# Patient Record
Sex: MALE | Race: WHITE | Employment: UNEMPLOYED | URBAN - METROPOLITAN AREA
[De-identification: names, ages, dates, MRNs, and addresses within clinical notes are randomized per-mention and may not be internally consistent; named-entity substitution may affect disease eponyms.]

---

## 2018-02-01 PROBLEM — K52.9 ENTERITIS: Status: ACTIVE | Noted: 2018-02-01

## 2018-02-01 PROBLEM — K56.609 SMALL BOWEL OBSTRUCTION (HCC): Status: ACTIVE | Noted: 2018-02-01

## 2018-02-03 PROBLEM — R77.8 ELEVATED TROPONIN: Status: ACTIVE | Noted: 2018-02-03

## 2018-02-05 PROBLEM — K52.9 ENTERITIS: Status: RESOLVED | Noted: 2018-02-01 | Resolved: 2018-02-05

## 2018-02-05 PROBLEM — I21.4 NSTEMI (NON-ST ELEVATED MYOCARDIAL INFARCTION) (HCC): Status: RESOLVED | Noted: 2018-02-03 | Resolved: 2018-02-05

## 2018-02-05 PROBLEM — I21.4 NSTEMI (NON-ST ELEVATED MYOCARDIAL INFARCTION) (HCC): Status: ACTIVE | Noted: 2018-02-03

## 2018-04-24 PROBLEM — R07.9 CHEST PAIN: Status: ACTIVE | Noted: 2018-04-24

## 2021-08-03 ENCOUNTER — APPOINTMENT (OUTPATIENT)
Dept: GENERAL RADIOLOGY | Age: 31
End: 2021-08-03
Attending: STUDENT IN AN ORGANIZED HEALTH CARE EDUCATION/TRAINING PROGRAM
Payer: MEDICAID

## 2021-08-03 ENCOUNTER — HOSPITAL ENCOUNTER (EMERGENCY)
Age: 31
Discharge: HOME | End: 2021-08-03
Attending: STUDENT IN AN ORGANIZED HEALTH CARE EDUCATION/TRAINING PROGRAM
Payer: MEDICAID

## 2021-08-03 VITALS
OXYGEN SATURATION: 99 % | WEIGHT: 305 LBS | HEIGHT: 71 IN | HEART RATE: 75 BPM | BODY MASS INDEX: 42.7 KG/M2 | SYSTOLIC BLOOD PRESSURE: 114 MMHG | RESPIRATION RATE: 23 BRPM | DIASTOLIC BLOOD PRESSURE: 63 MMHG | TEMPERATURE: 98 F

## 2021-08-03 DIAGNOSIS — R07.89 OTHER CHEST PAIN: Primary | ICD-10-CM

## 2021-08-03 LAB
ANION GAP SERPL CALC-SCNC: 2 MMOL/L (ref 3–18)
BASOPHILS # BLD: 0.1 K/UL (ref 0–0.1)
BASOPHILS NFR BLD: 1 % (ref 0–2)
BUN SERPL-MCNC: 10 MG/DL (ref 7–18)
BUN/CREAT SERPL: 13 (ref 12–20)
CALCIUM SERPL-MCNC: 8.9 MG/DL (ref 8.5–10.1)
CHLORIDE SERPL-SCNC: 106 MMOL/L (ref 100–111)
CO2 SERPL-SCNC: 29 MMOL/L (ref 21–32)
CREAT SERPL-MCNC: 0.77 MG/DL (ref 0.6–1.3)
DIFFERENTIAL METHOD BLD: ABNORMAL
EOSINOPHIL # BLD: 0.6 K/UL (ref 0–0.4)
EOSINOPHIL NFR BLD: 6 % (ref 0–5)
ERYTHROCYTE [DISTWIDTH] IN BLOOD BY AUTOMATED COUNT: 13.1 % (ref 11.6–14.5)
GLUCOSE SERPL-MCNC: 95 MG/DL (ref 74–99)
HCT VFR BLD AUTO: 43.8 % (ref 36–48)
HGB BLD-MCNC: 14.3 G/DL (ref 13–16)
LYMPHOCYTES # BLD: 2.4 K/UL (ref 0.9–3.6)
LYMPHOCYTES NFR BLD: 24 % (ref 21–52)
MAGNESIUM SERPL-MCNC: 2.2 MG/DL (ref 1.6–2.6)
MCH RBC QN AUTO: 26.1 PG (ref 24–34)
MCHC RBC AUTO-ENTMCNC: 32.6 G/DL (ref 31–37)
MCV RBC AUTO: 80.1 FL (ref 74–97)
MONOCYTES # BLD: 0.8 K/UL (ref 0.05–1.2)
MONOCYTES NFR BLD: 8 % (ref 3–10)
NEUTS SEG # BLD: 6.3 K/UL (ref 1.8–8)
NEUTS SEG NFR BLD: 61 % (ref 40–73)
PLATELET # BLD AUTO: 280 K/UL (ref 135–420)
PMV BLD AUTO: 10.6 FL (ref 9.2–11.8)
POTASSIUM SERPL-SCNC: 3.8 MMOL/L (ref 3.5–5.5)
RBC # BLD AUTO: 5.47 M/UL (ref 4.35–5.65)
SODIUM SERPL-SCNC: 137 MMOL/L (ref 136–145)
TROPONIN I SERPL-MCNC: <0.02 NG/ML (ref 0–0.04)
TROPONIN I SERPL-MCNC: <0.02 NG/ML (ref 0–0.04)
WBC # BLD AUTO: 10.3 K/UL (ref 4.6–13.2)

## 2021-08-03 PROCEDURE — 99285 EMERGENCY DEPT VISIT HI MDM: CPT

## 2021-08-03 PROCEDURE — 84484 ASSAY OF TROPONIN QUANT: CPT

## 2021-08-03 PROCEDURE — 80048 BASIC METABOLIC PNL TOTAL CA: CPT

## 2021-08-03 PROCEDURE — 93005 ELECTROCARDIOGRAM TRACING: CPT

## 2021-08-03 PROCEDURE — 85025 COMPLETE CBC W/AUTO DIFF WBC: CPT

## 2021-08-03 PROCEDURE — 71046 X-RAY EXAM CHEST 2 VIEWS: CPT

## 2021-08-03 PROCEDURE — 83735 ASSAY OF MAGNESIUM: CPT

## 2021-08-03 NOTE — ED TRIAGE NOTES
Patient arrived via ems for left sided chest tightness that started a couple of hours ago. Pt states he is supposed to be on plavix and metoprolol but ran out and hasnt found a new PCP. Patient is alert and oriented and ambulatory. EMS gave 324 of aspirin and patient states pain has improved. Denies SOB. Reports his dad passed away recently and is having extra stress with work.

## 2021-08-03 NOTE — Clinical Note
[address] [place] [address] [facility] EMERGENCY DEPT  [id number] [id number] [phone]    Work/School Note    Date: 8/3/2021    To Whom It May concern:      Lynnette Curling was seen and treated today in the emergency room by the following provider(s):  Attending Provider: Vicente Escobedo MD.      Lynnette Curling is excused from work/school on 08/03/21. He is clear to return to work/school on 08/04/21.         Sincerely,          Aide Santiago MD

## 2021-08-04 LAB
ATRIAL RATE: 91 BPM
CALCULATED P AXIS, ECG09: 27 DEGREES
CALCULATED R AXIS, ECG10: 8 DEGREES
CALCULATED T AXIS, ECG11: 10 DEGREES
DIAGNOSIS, 93000: NORMAL
P-R INTERVAL, ECG05: 158 MS
Q-T INTERVAL, ECG07: 362 MS
QRS DURATION, ECG06: 88 MS
QTC CALCULATION (BEZET), ECG08: 445 MS
VENTRICULAR RATE, ECG03: 91 BPM

## 2021-08-04 NOTE — ED PROVIDER NOTES
Patient is a 29-year-old male who presents with acute chest pain. He states that he was feeling a tightness in his chest that started a couple hours ago and then just prior to arrival felt a sharp stabbing radiating up into his neck and arm on the right. This is not exertional in nature. He is not sure what makes it better or worse. He does however feel better now compared to when it first happened. He states he is never had anything like this before so he called EMS. This is associate with some mild shortness of breath. He denies any palpitations. Denies any pain in his back. Denies any swelling of his lower extremities, history of blood clots, hemoptysis. Denies any recent fever, sweats or chills. He has been having increased rest recently but did not feel overly anxious.            Past Medical History:   Diagnosis Date    Asthma     Heart attack ([address]     Kidney stones        Past Surgical History:   Procedure Laterality Date    HX HEART CATHETERIZATION      LEFT HEART PERCUTANEOUS  2/5/2018              Family History:   Problem Relation Age of Onset    Heart Disease Mother     Hypertension Father     Diabetes Father        Social History     Socioeconomic History    Marital status: SINGLE     Spouse name: Not on file    Number of children: Not on file    Years of education: Not on file    Highest education level: Not on file   Occupational History    Not on file   Tobacco Use    Smoking status: Former Smoker    Smokeless tobacco: Never Used    Tobacco comment: quit 3  yrs ago   Substance and Sexual Activity    Alcohol use: No     Comment: occasionally    Drug use: No    Sexual activity: Not on file   Other Topics Concern    Not on file   Social History Narrative    Not on file     Social Determinants of Health     Financial Resource Strain:     Difficulty of Paying Living Expenses:    Food Insecurity:     Worried About Running Out of Food in the Last Year:     [employer] in the Last Year:    Transportation Needs:     Lack of Transportation (Medical):  Lack of Transportation (Non-Medical):    Physical Activity:     Days of Exercise per Week:     Minutes of Exercise per Session:    Stress:     Feeling of Stress :    Social Connections:     Frequency of Communication with Friends and Family:     Frequency of Social Gatherings with Friends and Family:     Attends Christian Services:     Active Member of Clubs or Organizations:     Attends Club or Organization Meetings:     Marital Status:    Intimate Partner Violence:     Fear of Current or Ex-Partner:     Emotionally Abused:     Physically Abused:     Sexually Abused: ALLERGIES: Penicillins    Review of Systems  Constitutional: No fever  HENT: No ear pain  Eyes: No change in vision  Respiratory: Positive shortness breath  Cardio: Positive for chest pain  GI: No blood in stool  : No hematuria  MSK: No back pain  Skin: No rashes  Neuro: No headache    Vitals:    08/03/21 1645 08/03/21 1700 08/03/21 1900 08/03/21 1915   BP: (!) 114/53 (!) 101/54 111/62 115/71   Pulse: 87 83 75 74   Resp: 15 17 18 17   Temp:       SpO2: 99% 97% 98% 98%   Weight:       Height:                Physical Exam   General: No acute distress  Head: Normocephalic, atraumatic  Psych: Cooperative and alert  Eyes: No scleral icterus, normal conjunctiva  ENT: Moist oral mucosa  Neck: Supple  CV: Regular rate and rhythm, no pitting edema, palpable radial pulses  Pulm: Clear breath sounds bilaterally without any wheezing or rhonchi, normal respiratory rate  GI: Normal bowel sounds, soft, non-tender  MSK: Moves all four extremities  Skin: No rashes  Neuro: Alert and conversive    MDM  Number of Diagnoses or Management Options  Other chest pain  Diagnosis management comments: Patient is a 28-year-old male with a history of hypertension and diabetes who presents with acute onset chest pain.   Although he is young he does have risk factors for ACS and since his story is typical we will do a ACS work-up including 2 troponins. He is PERC and Wells 0 and I think he requires a work-up for PE. I do not suspect a dissection in this patient. Is no concerning findings of infection or pneumonia. EKG is within normal limits. Chest x-ray shows no acute cardiopulmonary process per my interpretation    CBC, BMP, magnesium and troponin are within normal limits. Upon examination patient states that he continues to feel better. Repeat troponin is also stable at undetectable levels. Patient's heart score is 2, and if you feel comfortable to discharge the patient. This was discussed with him and he is agreement with this plan at this time. We did discuss the importance of following up with his primary care provider. Patient stable for discharge at this time. Patient is in agreement with the plan to be discharged at this time. All the patient's questions were answered. Patient was given written instructions on the diagnosis, and states understanding of the plan moving forward. We did discuss important signs and symptoms that should prompt quick return to the emergency department. Disposition: Patient was discharged home in stable condition.   They will follow up with their primary care physician    Prescriptions: None    Diagnosis: Acute chest pain, no diagnosis         Procedures

## 2021-10-07 PROBLEM — K35.80 ACUTE APPENDICITIS: Status: ACTIVE | Noted: 2021-10-07

## 2021-10-07 PROBLEM — E66.01 MORBID OBESITY (HCC): Status: ACTIVE | Noted: 2021-10-07

## 2021-10-07 PROBLEM — R11.0 NAUSEA: Status: ACTIVE | Noted: 2021-10-07
